# Patient Record
Sex: MALE | Race: WHITE | Employment: FULL TIME | URBAN - NONMETROPOLITAN AREA
[De-identification: names, ages, dates, MRNs, and addresses within clinical notes are randomized per-mention and may not be internally consistent; named-entity substitution may affect disease eponyms.]

---

## 2017-09-08 ENCOUNTER — HOSPITAL ENCOUNTER (OUTPATIENT)
Dept: CT IMAGING | Age: 52
Discharge: HOME | End: 2017-09-08
Payer: COMMERCIAL

## 2017-09-08 DIAGNOSIS — H74.21: ICD-10-CM

## 2017-09-08 DIAGNOSIS — H69.83 CHRONIC DYSFUNCTION OF BOTH EUSTACHIAN TUBES: ICD-10-CM

## 2017-09-08 PROCEDURE — 70480 CT ORBIT/EAR/FOSSA W/O DYE: CPT

## 2023-10-12 ENCOUNTER — HOSPITAL ENCOUNTER (EMERGENCY)
Age: 58
Discharge: HOME | End: 2023-10-12
Payer: COMMERCIAL

## 2023-10-12 VITALS
BODY MASS INDEX: 21.87 KG/M2 | SYSTOLIC BLOOD PRESSURE: 162 MMHG | HEART RATE: 90 BPM | DIASTOLIC BLOOD PRESSURE: 87 MMHG | RESPIRATION RATE: 16 BRPM | OXYGEN SATURATION: 97 % | HEIGHT: 73 IN | WEIGHT: 165 LBS | TEMPERATURE: 98.5 F

## 2023-10-12 DIAGNOSIS — R31.9 HEMATURIA, UNSPECIFIED TYPE: Primary | ICD-10-CM

## 2023-10-12 DIAGNOSIS — Z87.442 HISTORY OF KIDNEY STONES: ICD-10-CM

## 2023-10-12 LAB
BILIRUB UR STRIP.AUTO-MCNC: NEGATIVE MG/DL
CHARACTER UR: CLEAR
COLOR: YELLOW
GLUCOSE UR QL STRIP.AUTO: NEGATIVE MG/DL
KETONES UR QL STRIP.AUTO: NEGATIVE
NITRITE UR QL STRIP.AUTO: NEGATIVE
PH UR STRIP.AUTO: 5.5 [PH] (ref 5–9)
PROT UR STRIP.AUTO-MCNC: ABNORMAL MG/DL
RBC #/AREA URNS HPF: ABNORMAL /[HPF]
SP GR UR STRIP.AUTO: >= 1.03 (ref 1–1.03)
UROBILINOGEN, URINE: 0.2 EU/DL (ref 0.2–1)
WBC #/AREA URNS HPF: ABNORMAL /[HPF]

## 2023-10-12 PROCEDURE — 81003 URINALYSIS AUTO W/O SCOPE: CPT

## 2023-10-12 PROCEDURE — 99203 OFFICE O/P NEW LOW 30 MIN: CPT

## 2023-10-12 PROCEDURE — 87086 URINE CULTURE/COLONY COUNT: CPT

## 2023-10-12 RX ORDER — CIPROFLOXACIN 500 MG/1
500 TABLET, FILM COATED ORAL 2 TIMES DAILY
Qty: 14 TABLET | Refills: 0 | Status: SHIPPED | OUTPATIENT
Start: 2023-10-12 | End: 2023-10-19

## 2023-10-12 RX ORDER — TAMSULOSIN HYDROCHLORIDE 0.4 MG/1
0.4 CAPSULE ORAL DAILY
Qty: 5 CAPSULE | Refills: 0 | Status: SHIPPED | OUTPATIENT
Start: 2023-10-12 | End: 2023-10-17

## 2023-10-12 ASSESSMENT — ENCOUNTER SYMPTOMS
NAUSEA: 0
DIARRHEA: 0
ABDOMINAL PAIN: 0
VOMITING: 0
COUGH: 0

## 2023-10-12 ASSESSMENT — PAIN - FUNCTIONAL ASSESSMENT: PAIN_FUNCTIONAL_ASSESSMENT: NONE - DENIES PAIN

## 2023-10-12 NOTE — ED PROVIDER NOTES
[facility] Encounter      CHIEFCOMPLAINT       Chief Complaint   Patient presents with    Hematuria       Nurses Notes reviewed and I agree except as noted in the HPI. HISTORY OF PRESENT ILLNESS   Bernardo Lassiter is a 59 y.o. male who presents to the urgent care. He presents for evaluation of blood in his urine that started last night. No other symptoms. Had a kidney stone when he was younger. The patient/patient representative has no other acute complaints at this time. REVIEW OF SYSTEMS     Review of Systems   Constitutional:  Negative for chills, fatigue and fever. Respiratory:  Negative for cough. Cardiovascular:  Negative for chest pain. Gastrointestinal:  Negative for abdominal pain, diarrhea, nausea and vomiting. Genitourinary:  Positive for hematuria. Negative for dysuria, frequency, genital sores, penile discharge and urgency. Skin:  Negative for rash. Allergic/Immunologic: Negative for environmental allergies and food allergies. PAST MEDICAL HISTORY   History reviewed. No pertinent past medical history. SURGICAL HISTORY     Patient  has a past surgical history that includes Myringotomy Tympanostomy Tube Placement and Ear Exm Under Gen Anesth (Right, T). CURRENT MEDICATIONS       Previous Medications    ACETAMINOPHEN (TYLENOL) 500 MG TABLET    Take 1 tablet by mouth every 6 hours as needed    IBUPROFEN (ADVIL;MOTRIN) 200 MG TABLET    Take 1 tablet by mouth every 6 hours as needed       ALLERGIES     Patient is has No Known Allergies. FAMILY HISTORY     Patient'sfamily history includes Diabetes in an other family member; Hypertension in an other family member. SOCIAL HISTORY     Patient  reports that he has never smoked. He has never been exposed to tobacco smoke. He has never used smokeless tobacco. He reports current alcohol use. He reports that he does not use drugs.     PHYSICAL EXAM     ED TRIAGE VITALS  BP: (!) 162/87,

## 2023-10-14 LAB — BACTERIA UR CULT: NORMAL

## 2024-01-12 LAB — PROSTATE SPECIFIC ANTIGEN: 4 NG/ML

## 2024-01-30 ENCOUNTER — OFFICE VISIT (OUTPATIENT)
Dept: UROLOGY | Age: 59
End: 2024-01-30
Payer: COMMERCIAL

## 2024-01-30 VITALS — BODY MASS INDEX: 23.06 KG/M2 | RESPIRATION RATE: 16 BRPM | HEIGHT: 73 IN | WEIGHT: 174 LBS

## 2024-01-30 DIAGNOSIS — R31.0 GROSS HEMATURIA: Primary | ICD-10-CM

## 2024-01-30 DIAGNOSIS — R97.20 ELEVATED PSA: ICD-10-CM

## 2024-01-30 PROCEDURE — 99204 OFFICE O/P NEW MOD 45 MIN: CPT | Performed by: UROLOGY

## 2024-01-30 NOTE — PROGRESS NOTES
Federico Taylor MD.    [facility] [place] [facility]  [facility] UROLOGY  [address]  [address]  [facility] [address]  Dept: [phone]  Dept Fax: [phone]  Loc: [phone]    [facility] Office Note -     Patient:  Jose Elias Mueller  YOB: 1965    The patient is a 58 y.o. male who presents today for evaluation of the following problems:   Chief Complaint   Patient presents with    New Patient     PSA done prior, showed 4 in results.    Hematuria    referred/consultation requested by No primary care provider on file..    History of Present Illness:    Elevated PSA  First time check  No fam hx of prostate cancer  Auass 11  Mostly satisfied    Gross hematuria  One time episode  Episodic -- persistent on UA  Hx of kidney stones in past- pt thought hematuria was secondary to this        Requested/reviewed records from No primary care provider on file. office and/or outside physician/EMR    (Patient's old records have been requested, reviewed and pertinent findings summarized in today's note.)    Procedures Today: N/A      Last several PSA's:  Lab Results   Component Value Date    PSA 4.0 01/12/2024       Last total testosterone:  No results found for: \"TESTOSTERONE\"    Urinalysis today:  No results found for this visit on 01/30/24.    Last BUN and creatinine:  No results found for: \"BUN\"  No results found for: \"CREATININE\"      Imaging Reviewed during this Office Visit:   FEDERICO TAYLOR MD independently reviewed the images and verified the radiology reports from:    No results found.    PAST MEDICAL, FAMILY AND SOCIAL HISTORY:  No past medical history on file.  Past Surgical History:   Procedure Laterality Date    EAR EXAM UNDER GENERAL ANESTHESIA Right T    REMOVAL CHOLESTEATOMA    MYRINGOTOMY AND TYMPANOSTOMY TUBE PLACEMENT      Dr Rodrigues      Family History   Problem Relation Age of Onset    Diabetes Other     Hypertension Other      Outpatient Medications Marked as Taking

## 2024-02-28 ENCOUNTER — TELEPHONE (OUTPATIENT)
Dept: UROLOGY | Age: 59
End: 2024-02-28

## 2024-02-28 NOTE — TELEPHONE ENCOUNTER
CPT 99452- Prior authorization required/initiated  Spoke with: Anjana CROWLEY  Reference#: [id number]  Clinical documentation faxed to [phone]

## 2024-03-05 ENCOUNTER — HOSPITAL ENCOUNTER (OUTPATIENT)
Dept: CT IMAGING | Age: 59
Discharge: HOME | End: 2024-03-05
Attending: UROLOGY | Admitting: FAMILY MEDICINE
Payer: COMMERCIAL

## 2024-03-05 DIAGNOSIS — R31.0 GROSS HEMATURIA: ICD-10-CM

## 2024-03-05 LAB — POC CREATININE WHOLE BLOOD: 1 MG/DL (ref 0.5–1.2)

## 2024-03-05 PROCEDURE — 6360000004 HC RX CONTRAST MEDICATION: Performed by: UROLOGY

## 2024-03-05 PROCEDURE — 74178 CT ABD&PLV WO CNTR FLWD CNTR: CPT | Performed by: UROLOGY

## 2024-03-05 PROCEDURE — 82565 ASSAY OF CREATININE: CPT

## 2024-03-05 RX ADMIN — IOPAMIDOL 80 ML: 755 INJECTION, SOLUTION INTRAVENOUS at 07:18

## 2024-03-12 ENCOUNTER — HOSPITAL ENCOUNTER (OUTPATIENT)
Dept: UROLOGY | Age: 59
Discharge: HOME | End: 2024-03-12
Payer: COMMERCIAL

## 2024-03-12 VITALS
DIASTOLIC BLOOD PRESSURE: 95 MMHG | OXYGEN SATURATION: 99 % | WEIGHT: 175.5 LBS | RESPIRATION RATE: 18 BRPM | TEMPERATURE: 97.5 F | SYSTOLIC BLOOD PRESSURE: 168 MMHG | HEIGHT: 73 IN | HEART RATE: 84 BPM | BODY MASS INDEX: 23.26 KG/M2

## 2024-03-12 DIAGNOSIS — R97.20 ELEVATED PSA: Primary | ICD-10-CM

## 2024-03-12 LAB
BILIRUBIN URINE: NEGATIVE
BLOOD URINE, POC: ABNORMAL
CHARACTER, URINE: CLEAR
COLOR, URINE: YELLOW
GLUCOSE URINE: NEGATIVE MG/DL
KETONES, URINE: NEGATIVE
LEUKOCYTE CLUMPS, URINE: ABNORMAL
NITRITE, URINE: NEGATIVE
PH, URINE: 5.5 (ref 5–9)
PROTEIN, URINE: NEGATIVE MG/DL
SPECIFIC GRAVITY, URINE: >= 1.03 (ref 1–1.03)
UROBILINOGEN, URINE: 0.2 EU/DL (ref 0–1)

## 2024-03-12 PROCEDURE — 52000 CYSTOURETHROSCOPY: CPT

## 2024-03-12 NOTE — DISCHARGE INSTRUCTIONS
Discharge instructions: Cystoscopy  You May experience painful urination and see blood in the urine after your procedure.  This should resolve over time.      Pt ok to discharge home in good condition  No heavy lifting, >10 lbs for today  Pt should avoid strenuous activity for today  Pt should walk moderately at home  Pt ok to shower   Pt may resume diet as tolerated  Please call attending physician or hospital  with questions  Call or Present to ED if fever (> 101F), intractable nausea vomiting or pain.    PSA in 3 months, order given to patient    Follow up in office with JOSEPH Solares in 4 months.  Office will call to schedule.

## 2024-03-12 NOTE — OP NOTE
single:13281::\"left stent was removed with graspers in it's entirety\",\"right stent was removed with graspers in it's entirety\",\" both stents were removed with graspers in their entirety\",\" \"}    The cystoscope was removed.  The patient tolerated the procedure well.

## 2024-03-12 NOTE — PROGRESS NOTES
Patient arrived in [facility]rology Center for Cystoscopy  This procedure has been fully reviewed with the patient and written informed consent has been obtained.  1107 Procedure started with Dr. Elliott  1108 Procedure completed; patient tolerated well.  Dr. Elliott talked to patient in length about procedure findings.  Patient discharged.    PLAN     PSA in 3 months, order given to patient    Follow up in office with JOSEPH Solares in 4 months.  Office will call to schedule.

## 2024-07-17 LAB — PROSTATE SPECIFIC ANTIGEN: 6.7 NG/ML

## 2024-07-23 ENCOUNTER — OFFICE VISIT (OUTPATIENT)
Dept: UROLOGY | Age: 59
End: 2024-07-23
Payer: COMMERCIAL

## 2024-07-23 ENCOUNTER — TELEPHONE (OUTPATIENT)
Dept: UROLOGY | Age: 59
End: 2024-07-23

## 2024-07-23 VITALS — BODY MASS INDEX: 22.88 KG/M2 | RESPIRATION RATE: 16 BRPM | WEIGHT: 172.6 LBS | HEIGHT: 73 IN

## 2024-07-23 DIAGNOSIS — R97.20 ELEVATED PSA: Primary | ICD-10-CM

## 2024-07-23 DIAGNOSIS — R31.0 GROSS HEMATURIA: ICD-10-CM

## 2024-07-23 PROCEDURE — 99214 OFFICE O/P EST MOD 30 MIN: CPT

## 2024-07-23 RX ORDER — ATORVASTATIN CALCIUM 20 MG/1
20 TABLET, FILM COATED ORAL DAILY
COMMUNITY
Start: 2024-05-29

## 2024-07-23 NOTE — TELEPHONE ENCOUNTER
Patient scheduled for MRI PROSTATE W WO  at [facility] MR on 8/13/2024.  Arrival of 1530 for a 1600 scan time.  Order with instructions given to the patient in the office

## 2024-07-23 NOTE — PROGRESS NOTES
[facility] [place] [facility]  [facility] UROLOGY  [address]  [address]  [facility] [address]  Dept: [phone]  Loc: [phone]  Visit Date: 7/23/2024      HPI  Jose Elias Mueller is a 58 y.o. male that presents to the urology clinic for elevated PSA and gross hematuria follow-up.    Elliott patient. Elevated PSA on first check. Large rise on recheck from 4.0 to 6.7. No family history of prostate cancer. No prior MRI's or biopsies of the prostate.    Single episode of gross hematuria. Persistent microscopic hematuria on UA's. NEGATIVE hematuria workup. CTU and Cystoscopy in March 2024.    Most Recent PSA: 6.70 (07/17/24)      PAST MEDICAL, FAMILY AND SOCIAL HISTORY UPDATE:  Past Medical History:   Diagnosis Date    Gross hematuria      Past Surgical History:   Procedure Laterality Date    EAR EXAM UNDER GENERAL ANESTHESIA Right T    REMOVAL CHOLESTEATOMA    MYRINGOTOMY AND TYMPANOSTOMY TUBE PLACEMENT      Dr Rodrigues      Family History   Problem Relation Age of Onset    Diabetes Other     Hypertension Other      Outpatient Medications Marked as Taking for the 7/23/24 encounter (Office Visit) with Beck Graham PA-C   Medication Sig Dispense Refill    atorvastatin (LIPITOR) 20 MG tablet Take 1 tablet by mouth daily      UNABLE TO FIND          Patient has no known allergies.  Social History     Tobacco Use   Smoking Status Never    Passive exposure: Never   Smokeless Tobacco Never       Social History     Substance and Sexual Activity   Alcohol Use Yes       REVIEW OF SYSTEMS:  Constitutional: negative  Eyes: negative  Respiratory: negative  Cardiovascular: negative  Gastrointestinal: negative  Musculoskeletal: negative  Genitourinary: negative except for what is in HPI  Skin: negative   Neurological: negative  Hematological/Lymphatic: negative  Psychological: negative    Physical Exam:      Vitals:    07/23/24 1419   Resp: 16     Patient is a 58 y.o. male in no acute distress and alert and

## 2024-08-16 ENCOUNTER — HOSPITAL ENCOUNTER (OUTPATIENT)
Dept: MRI IMAGING | Age: 59
Discharge: HOME | End: 2024-08-16
Payer: COMMERCIAL

## 2024-08-16 DIAGNOSIS — R97.20 ELEVATED PSA: ICD-10-CM

## 2024-08-16 PROCEDURE — 76377 3D RENDER W/INTRP POSTPROCES: CPT

## 2024-08-16 PROCEDURE — 6360000004 HC RX CONTRAST MEDICATION

## 2024-08-16 PROCEDURE — A9579 GAD-BASE MR CONTRAST NOS,1ML: HCPCS

## 2024-08-16 RX ADMIN — GADOTERIDOL 15 ML: 279.3 INJECTION, SOLUTION INTRAVENOUS at 10:58

## 2024-08-21 ENCOUNTER — OFFICE VISIT (OUTPATIENT)
Dept: UROLOGY | Age: 59
End: 2024-08-21
Payer: COMMERCIAL

## 2024-08-21 VITALS — BODY MASS INDEX: 23.1 KG/M2 | WEIGHT: 174.3 LBS | RESPIRATION RATE: 16 BRPM | HEIGHT: 73 IN

## 2024-08-21 DIAGNOSIS — R35.1 BENIGN PROSTATIC HYPERPLASIA WITH NOCTURIA: ICD-10-CM

## 2024-08-21 DIAGNOSIS — N40.1 BENIGN PROSTATIC HYPERPLASIA WITH NOCTURIA: ICD-10-CM

## 2024-08-21 DIAGNOSIS — R97.20 ELEVATED PSA: Primary | ICD-10-CM

## 2024-08-21 DIAGNOSIS — R31.0 GROSS HEMATURIA: ICD-10-CM

## 2024-08-21 PROCEDURE — 99214 OFFICE O/P EST MOD 30 MIN: CPT

## 2024-08-21 RX ORDER — TAMSULOSIN HYDROCHLORIDE 0.4 MG/1
0.4 CAPSULE ORAL DAILY
Qty: 90 CAPSULE | Refills: 1 | Status: SHIPPED | OUTPATIENT
Start: 2024-08-21

## 2024-08-21 NOTE — PROGRESS NOTES
[facility] [place] [facility]  [facility] UROLOGY  [address]  [address]  [facility] [address]  Dept: [phone]  Loc: [phone]  Visit Date: 8/21/2024      HPI  Jose Elias Mueller is a 58 y.o. male that presents to the urology clinic for elevated PSA and gross hematuria follow-up.    Elliott patient. Elevated PSA on first check. Large rise on recheck from 4.0 to 6.7. No family history of prostate cancer. NORMAL workup. MRI with PI-RADS 2 findings. ExoDx WNL at 7.58.    Single episode of gross hematuria. Persistent microscopic hematuria on UA's. NEGATIVE hematuria workup. CTU and Cystoscopy in March 2024.    +Urinary frequency and nocturia x 2. Weakening urinary stream. Increasingly bothersome.    Most Recent PSA: 6.70 (07/17/24)      I personally reviewed images and their accompanying reports from the following studies:    MRI PROSTATE W WO CONTRAST     Result Date: 08/16/24    FINDINGS:   PROSTATE SIZE:  1. The prostate gland is markedly enlarged measuring 6.5 x 5.6 x 4.8 cm  2. The prostate volume is 90.85 ml.     TRANSITIONAL ZONE:  1. Markedly heterogeneous.  2. Multiple encapsulated and nonencapsulated nodules (PI-RADS 2     CENTRAL ZONE:  1. Unremarkable.     PERIPHERAL ZONE:  1. Predominantly intermediate T2 signal seen can relate to postinflammatory  changes.     SEMINAL VESICLES: Unremarkable.     NEUROVASCULAR BUNDLES: Intact     URINARY BLADDER/RECTUM/PELVIC DIAPHRAGM: Unremarkable.     LYMPH NODES:  1. Borderline 9 mm pelvic wall lymph node     OSSEOUS STRUCTURES:  1. Unremarkable.     OTHER:  1. Colonic diverticulosis.        IMPRESSION:  1. Marked prostatomegaly.  2. Overall PI-RADS assessment is PI-RADS 2.  3. The markedly heterogeneous appearance of the prostate decreases the  sensitivity for detecting clinically significant cancer.     Electronically signed by Dr. Yumiko Nguyen      PAST MEDICAL, FAMILY AND SOCIAL HISTORY UPDATE:  Past Medical History:   Diagnosis Date

## 2025-02-13 ENCOUNTER — TELEPHONE (OUTPATIENT)
Dept: UROLOGY | Age: 60
End: 2025-02-13

## 2025-02-13 NOTE — TELEPHONE ENCOUNTER
Patient went [facility] last night for retention and had a kirkpatrick placed. He has an appointment already scheduled on 02/18/2025.

## 2025-02-18 ENCOUNTER — TELEPHONE (OUTPATIENT)
Dept: UROLOGY | Age: 60
End: 2025-02-18

## 2025-02-18 ENCOUNTER — OFFICE VISIT (OUTPATIENT)
Dept: UROLOGY | Age: 60
End: 2025-02-18
Payer: COMMERCIAL

## 2025-02-18 VITALS
DIASTOLIC BLOOD PRESSURE: 88 MMHG | HEIGHT: 73 IN | SYSTOLIC BLOOD PRESSURE: 142 MMHG | WEIGHT: 171.7 LBS | BODY MASS INDEX: 22.76 KG/M2

## 2025-02-18 DIAGNOSIS — N40.1 BENIGN PROSTATIC HYPERPLASIA WITH NOCTURIA: ICD-10-CM

## 2025-02-18 DIAGNOSIS — R35.1 BENIGN PROSTATIC HYPERPLASIA WITH NOCTURIA: ICD-10-CM

## 2025-02-18 DIAGNOSIS — N40.1 BENIGN PROSTATIC HYPERPLASIA WITH URINARY RETENTION: ICD-10-CM

## 2025-02-18 DIAGNOSIS — Z87.898 HISTORY OF GROSS HEMATURIA: ICD-10-CM

## 2025-02-18 DIAGNOSIS — Z01.818 PRE-OP TESTING: ICD-10-CM

## 2025-02-18 DIAGNOSIS — R97.20 ELEVATED PSA: Primary | ICD-10-CM

## 2025-02-18 DIAGNOSIS — R35.1 NOCTURIA: ICD-10-CM

## 2025-02-18 DIAGNOSIS — R33.8 ACUTE URINARY RETENTION: ICD-10-CM

## 2025-02-18 DIAGNOSIS — R33.8 BENIGN PROSTATIC HYPERPLASIA WITH URINARY RETENTION: ICD-10-CM

## 2025-02-18 PROCEDURE — 99214 OFFICE O/P EST MOD 30 MIN: CPT

## 2025-02-18 RX ORDER — TAMSULOSIN HYDROCHLORIDE 0.4 MG/1
0.4 CAPSULE ORAL 2 TIMES DAILY
Qty: 180 CAPSULE | Refills: 1 | Status: SHIPPED | OUTPATIENT
Start: 2025-02-18

## 2025-02-18 NOTE — TELEPHONE ENCOUNTER
DO NOT TAKE  FISH OIL, MOBIC, IBUPROFEN, MOTRIN-LIKE DRUGS AND ANY MULTIVITAMINS OR OVER THE COUNTER SUPPLEMENTS 14 DAYS PRIOR TO SURGERY.    MUST HAVE AN ADULT OVER THE AGE OF 18 WITH YOU AT THE TIME OF THE DISCHARGE         Jose Elias Mueller 1965     Surgical Physician: Dr. Elliott      You have been scheduled for the procedure marked below:      Surgery: CYSTOSCOPY GREENLIGHT PHOTO VAPORIZATION OF PROSTATE         Date: 3/28/2025     Anesthesia:  General     Place of Service: [facility] --Second Floor Same Day Surgery         Arrive to same day surgery at:  CALL SURGERY DESK [phone], THE DAY PRIOR TO SURGERY BETWEEN 8AM-4PM, FOR ARRIVAL TIME (IF SURGERY IS ON A MONDAY, CALL THE FRIDAY PRIOR)      INSTRUCTIONS AS MARKED BELOW:    1.  DO NOT eat or drink anything after midnight before surgery.   2.  We prefer you shower or bathe with an antibacterial soap (Dial) the morning of surgery.  3  Please bring a current medication list, photo ID and insurance card(s) with you  4. Okay to take Tylenol  5. Take blood pressure or heart medication as directed, if taken in the morning take with a small sip of water  6.The office will call you in 1-2 days after your procedure to schedule a follow up.    DATE SENSITIVE PRE OP TESTING:    TO AVOID YOUR SURGERY BEING CANCELLED, DO TESTING ON THE DATE LISTED (*WALK IN* NO APPOINTMENT NEEDED).      DO EKG AND LABS NOW    DO URINE CULTURE ON 3/14/2025        Date: 2/18/2025

## 2025-02-18 NOTE — PROGRESS NOTES
[facility] [place] [facility]  [facility] UROLOGY  [address]  [address]  [facility] [address]  Dept: [phone]  Loc: [phone]  Visit Date: 2/18/2025      HPI  Jose Elias Mueller is a 59 y.o. male that presents to the urology clinic for urinary retention follow-up.    Acute Urinary Retention  First instance of retention. No coinciding UTI or constipation. Hernandez catheter placed at [facility] ED. Volume drained undocumented.    Taking Flomax 0.4 mg QD as prescribed.     [facility] w/ Obstruction on Cystoscopy from March of 2024.    Elevated PSA  No family history of prostate cancer. NORMAL workup. MRI with PI-RADS 2 findings. ExoDx WNL at 7.58.    Prostate Volume: 90.85 mL     Gross Hematuria  Single instance of gross hematuria. Persistent microscopic hematuria on UA's. NEGATIVE hematuria workup. CTU and Cystoscopy in March 2024.      PSA Trend  6.50   (02/12/25)  6.70   (07/17/24)      I personally reviewed images and their accompanying reports from the following studies:    MRI PROSTATE W WO CONTRAST     Result Date: 08/16/24    FINDINGS:   PROSTATE SIZE:  1. The prostate gland is markedly enlarged measuring 6.5 x 5.6 x 4.8 cm  2. The prostate volume is 90.85 ml.     TRANSITIONAL ZONE:  1. Markedly heterogeneous.  2. Multiple encapsulated and nonencapsulated nodules (PI-RADS 2     CENTRAL ZONE:  1. Unremarkable.     PERIPHERAL ZONE:  1. Predominantly intermediate T2 signal seen can relate to postinflammatory  changes.     SEMINAL VESICLES: Unremarkable.     NEUROVASCULAR BUNDLES: Intact     URINARY BLADDER/RECTUM/PELVIC DIAPHRAGM: Unremarkable.     LYMPH NODES:  1. Borderline 9 mm pelvic wall lymph node     OSSEOUS STRUCTURES:  1. Unremarkable.     OTHER:  1. Colonic diverticulosis.        IMPRESSION:  1. Marked prostatomegaly.  2. Overall PI-RADS assessment is PI-RADS 2.  3. The markedly heterogeneous appearance of the prostate decreases the  sensitivity for detecting clinically

## 2025-02-18 NOTE — TELEPHONE ENCOUNTER
Patient is scheduled for surgery with  on 3/28/2025. Surgery consent to be done on arrival. Patient states he/she does not follow with a cardiologist. Patient to do pre op EKG AND LABS NOW; DO URINE CULTURE ON 3/14/2025. Surgery instructions gone over with patient verbally in the office     Patient informed an adult over the age of 18 must be with them at the time of surgery and upon discharge      RADHA CONF#[id number] PER DALE

## 2025-02-20 ENCOUNTER — NURSE ONLY (OUTPATIENT)
Dept: UROLOGY | Age: 60
End: 2025-02-20

## 2025-02-20 DIAGNOSIS — R33.9 URINARY RETENTION: Primary | ICD-10-CM

## 2025-02-20 PROCEDURE — NBSRV NON-BILLABLE SERVICE: Performed by: NURSE PRACTITIONER

## 2025-02-20 NOTE — PROGRESS NOTES
Patient has given me verbal consent to perform kirkpatrick removal  Yes    DIAGNOSIS/INDICATION:  Urinary retention    Per Deena Collins APRN 10 cc of water deflated from kirkpatrick balloon. 16 Fr straight kirkpatrick removed without difficulty.    Foreskin reduced back down?  N/A      Pt will drink fluids and call office before 3PM or report to ER in 6-8 hours if patient unable to urinate.      F/u with provider as scheduled.

## 2025-02-24 ENCOUNTER — HOSPITAL ENCOUNTER (OUTPATIENT)
Age: 60
Discharge: HOME | End: 2025-02-24
Payer: COMMERCIAL

## 2025-02-24 DIAGNOSIS — Z01.818 PRE-OP TESTING: ICD-10-CM

## 2025-02-24 DIAGNOSIS — R97.20 ELEVATED PSA: ICD-10-CM

## 2025-02-24 DIAGNOSIS — R35.1 BENIGN PROSTATIC HYPERPLASIA WITH NOCTURIA: ICD-10-CM

## 2025-02-24 DIAGNOSIS — N40.1 BENIGN PROSTATIC HYPERPLASIA WITH NOCTURIA: ICD-10-CM

## 2025-02-24 LAB
EKG ATRIAL RATE: 67 BPM
EKG P AXIS: 68 DEGREES
EKG P-R INTERVAL: 166 MS
EKG Q-T INTERVAL: 370 MS
EKG QRS DURATION: 84 MS
EKG QTC CALCULATION (BAZETT): 390 MS
EKG R AXIS: 52 DEGREES
EKG T AXIS: 70 DEGREES
EKG VENTRICULAR RATE: 67 BPM

## 2025-02-24 PROCEDURE — 93005 ELECTROCARDIOGRAM TRACING: CPT

## 2025-02-25 LAB
BASOPHILS ABSOLUTE: 0 /ΜL
BASOPHILS RELATIVE PERCENT: 1 %
BUN BLDV-MCNC: 15 MG/DL
CALCIUM SERPL-MCNC: NORMAL MG/DL
CHLORIDE BLD-SCNC: 105 MMOL/L
CO2: 25 MMOL/L
CREAT SERPL-MCNC: 0.8 MG/DL
EGFR: 101
EOSINOPHILS ABSOLUTE: 0.1 /ΜL
EOSINOPHILS RELATIVE PERCENT: 3 %
GLUCOSE BLD-MCNC: 80 MG/DL
HCT VFR BLD CALC: 42.8 % (ref 41–53)
HEMOGLOBIN: 14 G/DL (ref 13.5–17.5)
LYMPHOCYTES ABSOLUTE: 0.9 /ΜL
LYMPHOCYTES RELATIVE PERCENT: 21 %
MCH RBC QN AUTO: 30.9 PG
MCHC RBC AUTO-ENTMCNC: 32.7 G/DL
MCV RBC AUTO: 95 FL
MONOCYTES ABSOLUTE: 0.5 /ΜL
MONOCYTES RELATIVE PERCENT: 12 %
NEUTROPHILS ABSOLUTE: 2.8 /ΜL
NEUTROPHILS RELATIVE PERCENT: 63 %
PDW BLD-RTO: 12.2 %
PLATELET # BLD: 224 K/ΜL
PMV BLD AUTO: NORMAL FL
POTASSIUM SERPL-SCNC: 4.6 MMOL/L
RBC # BLD: 4.53 10^6/ΜL
SODIUM BLD-SCNC: 141 MMOL/L
WBC # BLD: 4.5 10^3/ML

## 2025-03-10 ENCOUNTER — OFFICE VISIT (OUTPATIENT)
Dept: UROLOGY | Age: 60
End: 2025-03-10
Payer: COMMERCIAL

## 2025-03-10 VITALS — WEIGHT: 171 LBS | RESPIRATION RATE: 10 BRPM | BODY MASS INDEX: 22.66 KG/M2 | HEIGHT: 73 IN

## 2025-03-10 DIAGNOSIS — N40.1 BENIGN PROSTATIC HYPERPLASIA WITH NOCTURIA: ICD-10-CM

## 2025-03-10 DIAGNOSIS — R35.1 BENIGN PROSTATIC HYPERPLASIA WITH NOCTURIA: ICD-10-CM

## 2025-03-10 DIAGNOSIS — R97.20 ELEVATED PSA: ICD-10-CM

## 2025-03-10 DIAGNOSIS — R33.9 URINARY RETENTION: Primary | ICD-10-CM

## 2025-03-10 LAB
BILIRUBIN, URINE: NEGATIVE
BLOOD URINE, POC: NEGATIVE
CHARACTER, URINE: CLEAR
COLOR, UA: YELLOW
GLUCOSE URINE: NEGATIVE MG/DL
KETONES, URINE: NEGATIVE
LEUKOCYTE CLUMPS, URINE: NEGATIVE
NITRITE, URINE: NEGATIVE
PH, URINE: 5.5 (ref 5–9)
POST VOID RESIDUAL (PVR): 8 ML
PROTEIN, URINE: NEGATIVE MG/DL
SPECIFIC GRAVITY UA: >= 1.03 (ref 1–1.03)
UROBILINOGEN, URINE: 0.2 EU/DL (ref 0–1)

## 2025-03-10 PROCEDURE — 81003 URINALYSIS AUTO W/O SCOPE: CPT

## 2025-03-10 PROCEDURE — 99213 OFFICE O/P EST LOW 20 MIN: CPT

## 2025-03-10 PROCEDURE — 51798 US URINE CAPACITY MEASURE: CPT

## 2025-03-10 NOTE — PROGRESS NOTES
[facility] [place] [facility]  [facility] UROLOGY  [address]  [address]  [facility] [address]  Dept: [phone]  Loc: [phone]  Visit Date: 3/10/2025      HPI  Jose Elias Mueller is a 59 y.o. male that presents to the urology clinic for BPH follow-up.    Acute Urinary Retention  First instance of retention. No coinciding UTI or constipation. Hernandez catheter placed at [facility] ED. Volume drained undocumented.    Taking Flomax 0.4 mg BID as prescribed. Emptying improved with increase in Flomax. PVR of 8 mL in the office today.    [facility] w/ Obstruction on Cystoscopy from March of 2024.    Elevated PSA  No family history of prostate cancer. NORMAL workup. MRI with PI-RADS 2 findings. ExoDx WNL at 7.58.    Prostate Volume: 90.85 mL     Gross Hematuria  Single instance of gross hematuria. Persistent microscopic hematuria on UA's. NEGATIVE hematuria workup. CTU and Cystoscopy in March 2024.      PSA Trend  6.50   (02/12/25)  6.70   (07/17/24)    Results for orders placed or performed in visit on 03/10/25   POCT Urinalysis No Micro (Auto)   Result Value Ref Range    Glucose, Ur Negative NEGATIVE mg/dl    Bilirubin, Urine Negative     Ketones, Urine Negative NEGATIVE    Specific Gravity, UA >=1.030 1.002 - 1.030    Blood, UA POC Negative NEGATIVE    pH, Urine 5.50 5.0 - 9.0    Protein, Urine Negative NEGATIVE mg/dl    Urobilinogen, Urine 0.20 0.0 - 1.0 eu/dl    Nitrite, Urine Negative NEGATIVE    Leukocyte Clumps, Urine Negative NEGATIVE    Color, UA Yellow [id number]    Character, Urine Clear [id number]   poct post void residual   Result Value Ref Range    post void residual 8 ml           I personally reviewed images and their accompanying reports from the following studies:    MRI PROSTATE W WO CONTRAST     Result Date: 08/16/24    FINDINGS:   PROSTATE SIZE:  1. The prostate gland is markedly enlarged measuring 6.5 x 5.6 x 4.8 cm  2. The prostate volume is 90.85 ml.

## 2025-03-14 ENCOUNTER — LAB (OUTPATIENT)
Dept: LAB | Age: 60
End: 2025-03-14

## 2025-03-14 DIAGNOSIS — R97.20 ELEVATED PSA: ICD-10-CM

## 2025-03-14 DIAGNOSIS — N40.1 BENIGN PROSTATIC HYPERPLASIA WITH NOCTURIA: ICD-10-CM

## 2025-03-14 DIAGNOSIS — R35.1 BENIGN PROSTATIC HYPERPLASIA WITH NOCTURIA: ICD-10-CM

## 2025-03-14 DIAGNOSIS — Z01.818 PRE-OP TESTING: ICD-10-CM

## 2025-03-16 LAB
BACTERIA UR CULT: ABNORMAL
ORGANISM: ABNORMAL

## 2025-03-17 ENCOUNTER — RESULTS FOLLOW-UP (OUTPATIENT)
Dept: UROLOGY | Age: 60
End: 2025-03-17

## 2025-03-18 ENCOUNTER — PREP FOR PROCEDURE (OUTPATIENT)
Dept: UROLOGY | Age: 60
End: 2025-03-18

## 2025-03-20 RX ORDER — SODIUM CHLORIDE 0.9 % (FLUSH) 0.9 %
5-40 SYRINGE (ML) INJECTION EVERY 12 HOURS SCHEDULED
Status: CANCELLED | OUTPATIENT
Start: 2025-03-20

## 2025-03-20 RX ORDER — SODIUM CHLORIDE 9 MG/ML
INJECTION, SOLUTION INTRAVENOUS PRN
Status: CANCELLED | OUTPATIENT
Start: 2025-03-20

## 2025-03-20 RX ORDER — SODIUM CHLORIDE 0.9 % (FLUSH) 0.9 %
5-40 SYRINGE (ML) INJECTION PRN
Status: CANCELLED | OUTPATIENT
Start: 2025-03-20

## 2025-03-24 NOTE — PROGRESS NOTES
Pt stated he lost insurance as of 3/17/25 he was wondering who should he call in regards to this.  Offered to transfer him to Dr Elliott office he said he will call them later. He did apply to get [employer] insurance

## 2025-03-24 NOTE — FLOWSHEET NOTE
Follow all instructions given by your physician  If Urology case Call [phone] the weekday before procedure to find out Arrival Time.  Do not eat or drink anything after midnight prior to surgery(includes water, chewing gum, mints and ice chips)  Sips of water am of surgery with allowed medications  May brush teeth   Do not smoke or chew tobacco, drink alcoholic beverages or use any illicit drugs for 24 hours prior to surgery  Bring insurance info and photo ID  Bring pertinent paperwork with you from Doctor or surgeons's office  Wear clean comfortable, loose-fitting clothing  No make-up, nail polish, jewelry, piercings, or contact lenses to be worn day of surgery  No glue on dentures morning of surgery; you will be asked to remove them for surgery. Case for glasses.  Shower the night before and the morning of surgery with cleansing soap provided or a liquid antibacterial soap, dry with new fresh clean towel after each shower, no lotions, creams or powder.  Clean sheets and pillowcase on bed night before surgery  Bring medications in original bottles, Bring rescue inhalers with you  Bring CPAP/BIPAP machine if you have one ( you may be charged if one is needed in recovery room ) no pacemaker no     Do you have a DNR? no  Please Bring Healthcare Directive or Healthcare Power of  in so we can scan it into your chart.    Our pharmacy has a Meds to Beds program where they will deliver any new prescriptions you may have to your room before you leave. Our Pharmacy will clear it through your insurance; for example (same co pay). This enables you to take your new RX as soon as you need when you get home and avoids stop/wait delays on the way home.  Please have a form of payment with you and have someone designated as your Pharmacy contact with their phone # as you may not feel well or still be under the influence of anesthesia.    Please refer to the SSI-Surgical Site Infection Flyer you hopefully received in the  mail-together we can prevent infections; signs and symptoms reviewed.  When discharged be sure you understand how to care for your wound and that you have the Dr./office phone # to call if you have any concerns or questions about your wound.     needed at discharge and someone over 18 to stay with you for 24 hours overnight (surgery may be cancelled if you don't have this)  Report to [facility] on 2nd floor  If you would become ill prior to surgery, please call the surgeon  May have a visitor with you, we request that you limit to 2 visitors in pre-op area  Masks are recommended but not required, new masks at entrance desk  Call [phone] for any questions

## 2025-03-28 ENCOUNTER — ANESTHESIA (OUTPATIENT)
Dept: OPERATING ROOM | Age: 60
End: 2025-03-28
Payer: COMMERCIAL

## 2025-03-28 ENCOUNTER — ANESTHESIA EVENT (OUTPATIENT)
Dept: OPERATING ROOM | Age: 60
End: 2025-03-28
Payer: COMMERCIAL

## 2025-03-28 ENCOUNTER — HOSPITAL ENCOUNTER (OUTPATIENT)
Age: 60
Setting detail: OUTPATIENT SURGERY
Discharge: HOME | End: 2025-03-28
Attending: UROLOGY | Admitting: UROLOGY
Payer: COMMERCIAL

## 2025-03-28 ENCOUNTER — TELEPHONE (OUTPATIENT)
Dept: UROLOGY | Age: 60
End: 2025-03-28

## 2025-03-28 ENCOUNTER — HOSPITAL ENCOUNTER (EMERGENCY)
Age: 60
Discharge: HOME | End: 2025-03-28
Attending: EMERGENCY MEDICINE
Payer: COMMERCIAL

## 2025-03-28 VITALS
TEMPERATURE: 97 F | OXYGEN SATURATION: 97 % | BODY MASS INDEX: 22.45 KG/M2 | DIASTOLIC BLOOD PRESSURE: 85 MMHG | WEIGHT: 169.4 LBS | HEIGHT: 73 IN | RESPIRATION RATE: 18 BRPM | HEART RATE: 78 BPM | SYSTOLIC BLOOD PRESSURE: 131 MMHG

## 2025-03-28 VITALS
DIASTOLIC BLOOD PRESSURE: 88 MMHG | TEMPERATURE: 98.8 F | RESPIRATION RATE: 20 BRPM | HEIGHT: 73 IN | BODY MASS INDEX: 22.4 KG/M2 | HEART RATE: 123 BPM | SYSTOLIC BLOOD PRESSURE: 146 MMHG | WEIGHT: 169 LBS | OXYGEN SATURATION: 97 %

## 2025-03-28 DIAGNOSIS — R31.0 GROSS HEMATURIA: Primary | ICD-10-CM

## 2025-03-28 DIAGNOSIS — G89.18 POST-OP PAIN: Primary | ICD-10-CM

## 2025-03-28 LAB
ALBUMIN SERPL BCG-MCNC: 4.1 G/DL (ref 3.4–4.9)
ALP SERPL-CCNC: 86 U/L (ref 40–129)
ALT SERPL W/O P-5'-P-CCNC: 56 U/L (ref 10–50)
ANION GAP SERPL CALC-SCNC: 13 MEQ/L (ref 8–16)
AST SERPL-CCNC: 39 U/L (ref 10–50)
BACTERIA: ABNORMAL
BASOPHILS ABSOLUTE: 0 THOU/MM3 (ref 0–0.1)
BASOPHILS NFR BLD AUTO: 0.1 %
BILIRUB SERPL-MCNC: 0.5 MG/DL (ref 0.3–1.2)
BILIRUB UR QL STRIP: NEGATIVE
BUN SERPL-MCNC: 16 MG/DL (ref 8–23)
CALCIUM SERPL-MCNC: 8.9 MG/DL (ref 8.8–10.2)
CASTS #/AREA URNS LPF: ABNORMAL /LPF
CASTS #/AREA URNS LPF: ABNORMAL /LPF
CHARACTER UR: ABNORMAL
CHARCOAL URNS QL MICRO: ABNORMAL
CHLORIDE SERPL-SCNC: 104 MEQ/L (ref 98–111)
CO2 SERPL-SCNC: 23 MEQ/L (ref 22–29)
COLOR UR: YELLOW
CREAT SERPL-MCNC: 0.8 MG/DL (ref 0.7–1.2)
CRYSTALS URNS QL MICRO: ABNORMAL
DEPRECATED RDW RBC AUTO: 42.1 FL (ref 35–45)
EKG ATRIAL RATE: 110 BPM
EKG P AXIS: 64 DEGREES
EKG P-R INTERVAL: 160 MS
EKG Q-T INTERVAL: 324 MS
EKG QRS DURATION: 82 MS
EKG QTC CALCULATION (BAZETT): 438 MS
EKG R AXIS: 4 DEGREES
EKG T AXIS: 75 DEGREES
EKG VENTRICULAR RATE: 110 BPM
EOSINOPHIL NFR BLD AUTO: 0 %
EOSINOPHILS ABSOLUTE: 0 THOU/MM3 (ref 0–0.4)
EPITHELIAL CELLS, UA: ABNORMAL /HPF
ERYTHROCYTE [DISTWIDTH] IN BLOOD BY AUTOMATED COUNT: 12.7 % (ref 11.5–14.5)
GFR SERPL CREATININE-BSD FRML MDRD: > 90 ML/MIN/1.73M2
GLUCOSE SERPL-MCNC: 116 MG/DL (ref 74–109)
GLUCOSE UR QL STRIP.AUTO: NEGATIVE MG/DL
HCT VFR BLD AUTO: 43 % (ref 42–52)
HGB BLD-MCNC: 14.9 GM/DL (ref 14–18)
HGB UR QL STRIP.AUTO: ABNORMAL
IMM GRANULOCYTES # BLD AUTO: 0.03 THOU/MM3 (ref 0–0.07)
IMM GRANULOCYTES NFR BLD AUTO: 0.2 %
KETONES UR QL STRIP.AUTO: NEGATIVE
LEUKOCYTE ESTERASE UR QL STRIP.AUTO: ABNORMAL
LYMPHOCYTES ABSOLUTE: 0.5 THOU/MM3 (ref 1–4.8)
LYMPHOCYTES NFR BLD AUTO: 3.1 %
MCH RBC QN AUTO: 31.6 PG (ref 26–33)
MCHC RBC AUTO-ENTMCNC: 34.7 GM/DL (ref 32.2–35.5)
MCV RBC AUTO: 91.1 FL (ref 80–94)
MONOCYTES ABSOLUTE: 0.6 THOU/MM3 (ref 0.4–1.3)
MONOCYTES NFR BLD AUTO: 4.3 %
NEUTROPHILS ABSOLUTE: 13.9 THOU/MM3 (ref 1.8–7.7)
NEUTROPHILS NFR BLD AUTO: 92.3 %
NITRITE UR QL STRIP.AUTO: NEGATIVE
NRBC BLD AUTO-RTO: 0 /100 WBC
OSMOLALITY SERPL CALC.SUM OF ELEC: 281.6 MOSMOL/KG (ref 275–300)
PH UR STRIP.AUTO: 5.5 [PH] (ref 5–9)
PLATELET # BLD AUTO: 203 THOU/MM3 (ref 130–400)
PMV BLD AUTO: 11 FL (ref 9.4–12.4)
POTASSIUM SERPL-SCNC: 4.4 MEQ/L (ref 3.5–5.2)
PROT SERPL-MCNC: 7.1 G/DL (ref 6.4–8.3)
PROT UR STRIP.AUTO-MCNC: 100 MG/DL
RBC # BLD AUTO: 4.72 MILL/MM3 (ref 4.7–6.1)
RBC #/AREA URNS HPF: > 200 /HPF
RENAL EPI CELLS #/AREA URNS HPF: ABNORMAL /[HPF]
SODIUM SERPL-SCNC: 140 MEQ/L (ref 135–145)
SPECIFIC GRAVITY UA: 1.01 (ref 1–1.03)
UROBILINOGEN, URINE: 0.2 EU/DL (ref 0–1)
WBC # BLD AUTO: 15.1 THOU/MM3 (ref 4.8–10.8)
WBC #/AREA URNS HPF: ABNORMAL /HPF
YEAST LIKE FUNGI URNS QL MICRO: ABNORMAL

## 2025-03-28 PROCEDURE — 3600000013 HC SURGERY LEVEL 3 ADDTL 15MIN: Performed by: UROLOGY

## 2025-03-28 PROCEDURE — 99284 EMERGENCY DEPT VISIT MOD MDM: CPT

## 2025-03-28 PROCEDURE — 3700000001 HC ADD 15 MINUTES (ANESTHESIA): Performed by: UROLOGY

## 2025-03-28 PROCEDURE — 7100000001 HC PACU RECOVERY - ADDTL 15 MIN: Performed by: UROLOGY

## 2025-03-28 PROCEDURE — 7100000011 HC PHASE II RECOVERY - ADDTL 15 MIN: Performed by: UROLOGY

## 2025-03-28 PROCEDURE — 2580000003 HC RX 258

## 2025-03-28 PROCEDURE — 36415 COLL VENOUS BLD VENIPUNCTURE: CPT

## 2025-03-28 PROCEDURE — 2580000003 HC RX 258: Performed by: UROLOGY

## 2025-03-28 PROCEDURE — 96360 HYDRATION IV INFUSION INIT: CPT

## 2025-03-28 PROCEDURE — 80053 COMPREHEN METABOLIC PANEL: CPT

## 2025-03-28 PROCEDURE — 3600000003 HC SURGERY LEVEL 3 BASE: Performed by: UROLOGY

## 2025-03-28 PROCEDURE — 2500000003 HC RX 250 WO HCPCS: Performed by: UROLOGY

## 2025-03-28 PROCEDURE — 2709999900 HC NON-CHARGEABLE SUPPLY: Performed by: UROLOGY

## 2025-03-28 PROCEDURE — 6360000002 HC RX W HCPCS: Performed by: UROLOGY

## 2025-03-28 PROCEDURE — 7100000000 HC PACU RECOVERY - FIRST 15 MIN: Performed by: UROLOGY

## 2025-03-28 PROCEDURE — 6360000002 HC RX W HCPCS: Performed by: REGISTERED NURSE

## 2025-03-28 PROCEDURE — 85025 COMPLETE CBC W/AUTO DIFF WBC: CPT

## 2025-03-28 PROCEDURE — 93005 ELECTROCARDIOGRAM TRACING: CPT

## 2025-03-28 PROCEDURE — 7100000010 HC PHASE II RECOVERY - FIRST 15 MIN: Performed by: UROLOGY

## 2025-03-28 PROCEDURE — 2720000010 HC SURG SUPPLY STERILE: Performed by: UROLOGY

## 2025-03-28 PROCEDURE — 81001 URINALYSIS AUTO W/SCOPE: CPT

## 2025-03-28 PROCEDURE — 3700000000 HC ANESTHESIA ATTENDED CARE: Performed by: UROLOGY

## 2025-03-28 RX ORDER — SODIUM CHLORIDE 9 MG/ML
INJECTION, SOLUTION INTRAVENOUS PRN
Status: DISCONTINUED | OUTPATIENT
Start: 2025-03-28 | End: 2025-03-28 | Stop reason: HOSPADM

## 2025-03-28 RX ORDER — LIDOCAINE HYDROCHLORIDE 20 MG/ML
INJECTION, SOLUTION INTRAVENOUS
Status: DISCONTINUED | OUTPATIENT
Start: 2025-03-28 | End: 2025-03-28 | Stop reason: SDUPTHER

## 2025-03-28 RX ORDER — NALOXONE HYDROCHLORIDE 0.4 MG/ML
INJECTION, SOLUTION INTRAMUSCULAR; INTRAVENOUS; SUBCUTANEOUS PRN
Status: DISCONTINUED | OUTPATIENT
Start: 2025-03-28 | End: 2025-03-28 | Stop reason: HOSPADM

## 2025-03-28 RX ORDER — DEXAMETHASONE SODIUM PHOSPHATE 4 MG/ML
INJECTION, SOLUTION INTRA-ARTICULAR; INTRALESIONAL; INTRAMUSCULAR; INTRAVENOUS; SOFT TISSUE
Status: DISCONTINUED | OUTPATIENT
Start: 2025-03-28 | End: 2025-03-28 | Stop reason: SDUPTHER

## 2025-03-28 RX ORDER — SODIUM CHLORIDE 0.9 % (FLUSH) 0.9 %
5-40 SYRINGE (ML) INJECTION EVERY 12 HOURS SCHEDULED
Status: DISCONTINUED | OUTPATIENT
Start: 2025-03-28 | End: 2025-03-28 | Stop reason: HOSPADM

## 2025-03-28 RX ORDER — PHENAZOPYRIDINE HYDROCHLORIDE 100 MG/1
100 TABLET, FILM COATED ORAL 3 TIMES DAILY PRN
Qty: 6 TABLET | Refills: 0 | Status: SHIPPED | OUTPATIENT
Start: 2025-03-28 | End: 2026-03-28

## 2025-03-28 RX ORDER — SODIUM CHLORIDE 0.9 % (FLUSH) 0.9 %
5-40 SYRINGE (ML) INJECTION PRN
Status: DISCONTINUED | OUTPATIENT
Start: 2025-03-28 | End: 2025-03-28 | Stop reason: HOSPADM

## 2025-03-28 RX ORDER — 0.9 % SODIUM CHLORIDE 0.9 %
500 INTRAVENOUS SOLUTION INTRAVENOUS ONCE
Status: COMPLETED | OUTPATIENT
Start: 2025-03-28 | End: 2025-03-28

## 2025-03-28 RX ORDER — FENTANYL CITRATE 50 UG/ML
25 INJECTION, SOLUTION INTRAMUSCULAR; INTRAVENOUS EVERY 5 MIN PRN
Status: DISCONTINUED | OUTPATIENT
Start: 2025-03-28 | End: 2025-03-28 | Stop reason: HOSPADM

## 2025-03-28 RX ORDER — HYDROCODONE BITARTRATE AND ACETAMINOPHEN 5; 325 MG/1; MG/1
1 TABLET ORAL EVERY 6 HOURS PRN
Qty: 10 TABLET | Refills: 0 | Status: SHIPPED | OUTPATIENT
Start: 2025-03-28 | End: 2025-03-31

## 2025-03-28 RX ORDER — PROPOFOL 10 MG/ML
INJECTION, EMULSION INTRAVENOUS
Status: DISCONTINUED | OUTPATIENT
Start: 2025-03-28 | End: 2025-03-28 | Stop reason: SDUPTHER

## 2025-03-28 RX ORDER — DOXYCYCLINE HYCLATE 100 MG
100 TABLET ORAL 2 TIMES DAILY
Qty: 14 TABLET | Refills: 0 | Status: SHIPPED | OUTPATIENT
Start: 2025-03-28 | End: 2025-04-04

## 2025-03-28 RX ORDER — FENTANYL CITRATE 50 UG/ML
50 INJECTION, SOLUTION INTRAMUSCULAR; INTRAVENOUS EVERY 5 MIN PRN
Status: DISCONTINUED | OUTPATIENT
Start: 2025-03-28 | End: 2025-03-28 | Stop reason: HOSPADM

## 2025-03-28 RX ORDER — FENTANYL CITRATE 50 UG/ML
INJECTION, SOLUTION INTRAMUSCULAR; INTRAVENOUS
Status: DISCONTINUED | OUTPATIENT
Start: 2025-03-28 | End: 2025-03-28 | Stop reason: SDUPTHER

## 2025-03-28 RX ORDER — ONDANSETRON 2 MG/ML
INJECTION INTRAMUSCULAR; INTRAVENOUS
Status: DISCONTINUED | OUTPATIENT
Start: 2025-03-28 | End: 2025-03-28 | Stop reason: SDUPTHER

## 2025-03-28 RX ADMIN — DEXAMETHASONE SODIUM PHOSPHATE 4 MG: 4 INJECTION, SOLUTION INTRAMUSCULAR; INTRAVENOUS at 10:33

## 2025-03-28 RX ADMIN — FENTANYL CITRATE 25 MCG: 50 INJECTION, SOLUTION INTRAMUSCULAR; INTRAVENOUS at 10:51

## 2025-03-28 RX ADMIN — SODIUM CHLORIDE: 9 INJECTION, SOLUTION INTRAVENOUS at 11:27

## 2025-03-28 RX ADMIN — FENTANYL CITRATE 25 MCG: 50 INJECTION, SOLUTION INTRAMUSCULAR; INTRAVENOUS at 11:18

## 2025-03-28 RX ADMIN — FENTANYL CITRATE 25 MCG: 50 INJECTION, SOLUTION INTRAMUSCULAR; INTRAVENOUS at 10:39

## 2025-03-28 RX ADMIN — SODIUM CHLORIDE: 9 INJECTION, SOLUTION INTRAVENOUS at 09:47

## 2025-03-28 RX ADMIN — LIDOCAINE HYDROCHLORIDE 100 MG: 20 INJECTION, SOLUTION INTRAVENOUS at 10:33

## 2025-03-28 RX ADMIN — FENTANYL CITRATE 25 MCG: 50 INJECTION, SOLUTION INTRAMUSCULAR; INTRAVENOUS at 11:11

## 2025-03-28 RX ADMIN — FENTANYL CITRATE 25 MCG: 50 INJECTION, SOLUTION INTRAMUSCULAR; INTRAVENOUS at 10:35

## 2025-03-28 RX ADMIN — SODIUM CHLORIDE 500 ML: 0.9 INJECTION, SOLUTION INTRAVENOUS at 21:48

## 2025-03-28 RX ADMIN — FENTANYL CITRATE 25 MCG: 50 INJECTION, SOLUTION INTRAMUSCULAR; INTRAVENOUS at 11:04

## 2025-03-28 RX ADMIN — WATER 2000 MG: 1 INJECTION INTRAMUSCULAR; INTRAVENOUS; SUBCUTANEOUS at 10:36

## 2025-03-28 RX ADMIN — ONDANSETRON 4 MG: 2 INJECTION INTRAMUSCULAR; INTRAVENOUS at 10:33

## 2025-03-28 RX ADMIN — PROPOFOL 200 MG: 10 INJECTION, EMULSION INTRAVENOUS at 10:33

## 2025-03-28 ASSESSMENT — ENCOUNTER SYMPTOMS
SHORTNESS OF BREATH: 0
DIARRHEA: 0
NAUSEA: 0
CONSTIPATION: 0
ABDOMINAL PAIN: 0
CHEST TIGHTNESS: 0
VOMITING: 0

## 2025-03-28 ASSESSMENT — PAIN SCALES - WONG BAKER
WONGBAKER_NUMERICALRESPONSE: NO HURT
WONGBAKER_NUMERICALRESPONSE: NO HURT

## 2025-03-28 ASSESSMENT — PAIN - FUNCTIONAL ASSESSMENT
PAIN_FUNCTIONAL_ASSESSMENT: NONE - DENIES PAIN
PAIN_FUNCTIONAL_ASSESSMENT: 0-10
PAIN_FUNCTIONAL_ASSESSMENT: ADULT NONVERBAL PAIN SCALE (NPVS)

## 2025-03-28 ASSESSMENT — PAIN SCALES - GENERAL
PAINLEVEL_OUTOF10: 0

## 2025-03-28 NOTE — BRIEF OP NOTE
Brief Postoperative Note      Patient: Jose Elias Mueller  YOB: 1965  MRN: [id number]    Date of Procedure: 3/28/2025    Pre-Op Diagnosis Codes:      * Benign prostatic hyperplasia with urinary retention [N40.1, R33.8]     * Nocturia [R35.1]    Post-Op Diagnosis: Same       Procedure(s):  CYSTOSCOPY GREENLIGHT PHOTO VAPORIZATION OF PROSTATE    Surgeon(s):  Federico Elliott MD    Assistant:  * No surgical staff found *    Anesthesia: General    Estimated Blood Loss (mL): Minimal    Complications: None    Specimens:   * No specimens in log *    Implants:  * No implants in log *      Drains:   Urinary Catheter 03/28/25 3 Way (Active)       Findings:  Very large prostate. Hernandez out Monday . Pain meds abx pyridium  2-3 weeks flavio pvr    Electronically signed by FEDERICO ELLIOTT MD on 3/28/2025 at 11:43 AM

## 2025-03-28 NOTE — PROGRESS NOTES
1135 Pt arrives to pacu at this time. Pt is arousable to voice but does not answer questions or follow commands. Falls asleep easily. Respirs unlabored on room air.Pt appears in no acute distress.  VSS.     1145 Pt awakens to voice. Shakes head \"no\" when asked if having pain. Falls back asleep. VSS on room air.     1155 - Pt awakens to voice. Able to follow commands and asnwers questions appropriately. Denies pain. States it \"just feels like he has to pee\" Denies nausea or feeling cold. VSS.     1205 - Pt meets criteria to discharge from pacu at this time. Pt denies pain, nausea or feeling cold. Respirs unlabored on room air. VSS. Transported to [facility] in stable condition. Wife at bedside.

## 2025-03-28 NOTE — H&P
LOUISE TAYLOR MD  History and Physical    Patient:  Jose Elias Mueller  MRN: [id number]  YOB: 1965    HISTORY OF PRESENT ILLNESS:     The patient is a 59 y.o. male who presents with bph. Here for procedure.    Patient's old records, notes and chart reviewed and summarized above.     LOUISE TAYLOR MD independently reviewed the images and verified the radiology reports from:    No results found.      Past Medical History:    Past Medical History:   Diagnosis Date    Gross hematuria     Hyperlipidemia     Kidney stones        Past Surgical History:    Past Surgical History:   Procedure Laterality Date    EAR EXAM UNDER GENERAL ANESTHESIA Right T    REMOVAL CHOLESTEATOMA    MYRINGOTOMY AND TYMPANOSTOMY TUBE PLACEMENT      Dr Rodrigues        Medications Prior to Admission:    Prior to Admission medications    Medication Sig Start Date End Date Taking? Authorizing Provider   tamsulosin (FLOMAX) 0.4 MG capsule Take 1 capsule by mouth 2 times daily 2/18/25  Yes Beck Graham PA-C   atorvastatin (LIPITOR) 20 MG tablet Take 1 tablet by mouth daily 5/29/24  Yes ProviderZackary MD   UNABLE TO FIND  7/21/23  Yes ProviderZackary MD       Allergies:  Patient has no known allergies.    Social History:    Social History     Socioeconomic History    Marital status:      Spouse name: Not on file    Number of children: Not on file    Years of education: Not on file    Highest education level: Not on file   Occupational History    Not on file   Tobacco Use    Smoking status: Never     Passive exposure: Never    Smokeless tobacco: Never   Vaping Use    Vaping status: Never Used   Substance and Sexual Activity    Alcohol use: Yes     Comment: socialy    Drug use: No    Sexual activity: Not on file   Other Topics Concern    Not on file   Social History Narrative    Not on file     Social Drivers of Health     Financial Resource Strain: Not on file   Food Insecurity: Not on file   Transportation Needs: Not on  file   Physical Activity: Not on file   Stress: Not on file   Social Connections: Not on file   Intimate Partner Violence: Not on file   Housing Stability: Not on file       Family History:    Family History   Problem Relation Age of Onset    Diabetes Mother     No Known Problems Father     Diabetes Other     Hypertension Other        REVIEW OF SYSTEMS:  Constitutional: negative  Eyes: negative  Respiratory: negative  Cardiovascular: negative  Gastrointestinal: negative  Genitourinary: no acute issues  Musculoskeletal: negative  Skin: negative   Neurological: negative  Hematological/Lymphatic: negative  Psychological: negative    Physical Exam:      Patient Vitals for the past 24 hrs:   BP Temp Temp src Pulse Resp SpO2 Height Weight   03/28/25 0917 (!) 154/86 97.7 °F (36.5 °C) Temporal 88 16 97 % 1.854 m (6' 1\") 76.8 kg (169 lb 6.4 oz)     Constitutional: Patient in no acute distress;   Neuro: alert and oriented to person place and time.    Psych: Mood and affect normal.  Skin: Normal  Lungs: Respiratory effort normal, CTA  Cardiovascular:  Normal peripheral pulses; no murmur. Normal rhythm  Abdomen: Soft, non-tender, non-distended with no CVA, flank pain, hepatosplenomegaly or hernia.  Kidneys normal.  Bladder non-tender and not distended.      LABS:   No results for input(s): \"WBC\", \"HGB\", \"HCT\", \"MCV\", \"PLT\" in the last 72 hours.  No results for input(s): \"NA\", \"K\", \"CL\", \"CO2\", \"PHOS\", \"BUN\", \"CREATININE\" in the last 72 hours.    Invalid input(s): \"CA\"  Lab Results   Component Value Date    PSA 6.5 02/12/2025    PSA 6.7 07/17/2024    PSA 4.0 01/12/2024         Urinalysis: No results for input(s): \"COLORU\", \"PHUR\", \"LABCAST\", \"WBCUA\", \"RBCUA\", \"MUCUS\", \"TRICHOMONAS\", \"YEAST\", \"BACTERIA\", \"CLARITYU\", \"SPECGRAV\", \"LEUKOCYTESUR\", \"UROBILINOGEN\", \"BILIRUBINUR\", \"BLOODU\" in the last 72 hours.    Invalid input(s): \"NITRATE\", \"GLUCOSEUKETONESUAMORPHOUS\"

## 2025-03-28 NOTE — ANESTHESIA PRE PROCEDURE
Department of Anesthesiology  Preprocedure Note       Name:  Jose Elias Mueller   Age:  59 y.o.  :  1965                                          MRN:  [id number]         Date:  3/28/2025      Surgeon: Surgeon(s):  Federico Elliott MD    Procedure: Procedure(s):  CYSTOSCOPY GREENLIGHT PHOTO VAPORIZATION OF PROSTATE    Medications prior to admission:   Prior to Admission medications    Medication Sig Start Date End Date Taking? Authorizing Provider   tamsulosin (FLOMAX) 0.4 MG capsule Take 1 capsule by mouth 2 times daily 25  Yes Beck Graham PA-C   atorvastatin (LIPITOR) 20 MG tablet Take 1 tablet by mouth daily 24  Yes ProviderZackary MD   UNABLE TO FIND  23  Yes ProviderZackary MD       Current medications:    Current Facility-Administered Medications   Medication Dose Route Frequency Provider Last Rate Last Admin   • sodium chloride flush 0.9 % injection 5-40 mL  5-40 mL IntraVENous 2 times per day Federico Elliott MD       • sodium chloride flush 0.9 % injection 5-40 mL  5-40 mL IntraVENous PRN Federico Elliott MD       • 0.9 % sodium chloride infusion   IntraVENous PRN Federico Elliott  mL/hr at 25 0947 New Bag at 25 0947   • ceFAZolin (ANCEF) 2,000 mg in sterile water 20 mL IV syringe  2,000 mg IntraVENous On Call to OR Federico Elliott MD           Allergies:  No Known Allergies    Problem List:    Patient Active Problem List   Diagnosis Code   • Deviated nasal septum J34.2   • Mixed hearing loss H90.8   • Eustachian tube dysfunction H69.90   • Otitis media H66.90   • Hearing loss H91.90   • Cholesteatoma of attic H71.00   • Sensorineural hearing loss H90.5   • Cerumen impaction H61.20   • Benign prostatic hyperplasia with urinary retention N40.1, R33.8   • Nocturia R35.1       Past Medical History:        Diagnosis Date   • Gross hematuria    • Hyperlipidemia    • Kidney stones        Past Surgical History:        Procedure Laterality Date   • EAR EXAM UNDER GENERAL ANESTHESIA Right T

## 2025-03-28 NOTE — DISCHARGE INSTRUCTIONS
Follow up monday with office to remove kirkpatrick catheter  Follow up with urology provider in 2 to 3 weeks  Office to call.     Norco prn for pain  Pyridium for pain with urination prn  Doxycycline for 7 days (antibiotic)    Greenlight Photovaporization of the Prostate (PVP)    Procedure    After you are asleep the doctor will insert a small catheter with a camera on the end of it (scope) into your urethra.  After the scope is in place the doctor will pass a laser device through the scope to the location of the enlarged prostate tissue.  The laser will be used to vaporize the prostate tissue obstructing urine flow.  A temporary urinary catheter will likely be placed at the end of the procedure to let urine drain from your bladder.         ?Post operative Recovery    -Many patients can go home the same day of the procedure but some need to stay overnight.   -Many patients are discharged with a catheter in place.  Your surgeon will determine how long the catheter should remain in place prior to removal.  ?    -You may see blood with urination on and off for at least 4 weeks, this is normal and will improve. The most important thing is if the catheter is draining and you are able to empty your bladder.    -You may have the urge to urinate despite having the catheter in place and draining well and bladder muscle spasms around the urinary catheter.  Your physician may prescribe a medication called oxybutynin (Ditropan) to help with this symptom.     -Vaseline or antibiotic ointment at the urethra where the catheter is inserted can help with discomfort there.    -After catheter removal you may have burning off and on for a few weeks and this is expected.  If the burning is severe your doctor may also advise you to take medicine that numbs the burning.? It is available by prescription and over the counter.? Brand names include Pyridium and Uristat.    -Some patients experience greater urgency and frequency to urinate after  kirkpatrick removal and this can take several weeks to resolve.  If severe, your physician may prescribe medication to help with symptoms.   -Avoid riding on a lawnmower, tractor, golf cart, 4-mclaughlin or other all-terrain vehicle over rough ground for 4 weeks or until cleared by your surgeon as this may prompt increased bleeding.       Normal Postoperative Instructions   -No driving on day of surgery and while on narcotic pain medication    -No heavy lifting (>20 pounds), pushing, pulling or straining for 6 weeks from surgery or until cleared by your surgeon.    -Walk moderately at home    -Ok to shower?    -Resume diet as tolerated    -Drink at least 64 ounces of fluid per day.   -Take prescriptions as directed        Please call the office or hospital  with questions    Call or Present to ED if fever (> 101F), intractable nausea vomiting, or pain.        Office will facilitate scheduling of follow-up appointment.??

## 2025-03-28 NOTE — PROGRESS NOTES
1400- Catheter draining. CBI clamped     1425- Catheter bloody but clear with no clots and draining. Dr. Elliott contacted to look at catheter to make sure patient is okay to discharge home.     1430- Dr. Lexi iyer with patient discharging home.     Patient educated to drink plenty of water/fluids and educated on catheter.     Pt has met discharge criteria and states he is ready for discharge to home. IV removed, gauze and tape applied. Dressed in own clothes and personal belongings gathered. Discharge instructions (with opioid medication education information) given to pt and family; pt and family verbalized understanding of discharge instructions, prescriptions and follow up appointments. Pt transported to discharge lobby by [facility] staff.

## 2025-03-28 NOTE — PROGRESS NOTES
Patient oriented to Same Day department and admitted to Same Day Surgery room 17.   Patient verbalized approval for first name, last initial with physician name on unit whiteboard.     Plan of care reviewed with patient.   Patient room whiteboard filled out and discussed with patient and responsible adult.   Patient and responsible adult offered Same Day Welcome Packet to review.    Call light in reach.   Bed in lowest position, locked, with one bed rail up.   SCDs and warming blanket in place.  Appropriate arm bands on patient.   Bathroom offered.   All questions and concerns of patient addressed.        Meds to Beds:   Patient informed of [facility] Meds to [facility]eds program during admission. Patient has declined use of program.

## 2025-03-28 NOTE — ANESTHESIA POSTPROCEDURE EVALUATION
Department of Anesthesiology  Postprocedure Note    Patient: Jose Elias Mueller  MRN: [id number]  YOB: 1965  Date of evaluation: 3/28/2025    Procedure Summary       Date: 03/28/25 Room / Location: [facility] OR 03 / [facility] OR    Anesthesia Start: 1030 Anesthesia Stop: 1136    Procedure: CYSTOSCOPY GREENLIGHT PHOTO VAPORIZATION OF PROSTATE Diagnosis:       Benign prostatic hyperplasia with urinary retention      Nocturia      (Benign prostatic hyperplasia with urinary retention [N40.1, R33.8])      (Nocturia [R35.1])    Surgeons: Federico Elliott MD Responsible Provider: Esteban Saucedo DO    Anesthesia Type: general ASA Status: 2            Anesthesia Type: No value filed.    Sukumar Phase I: Sukumar Score: 10    Sukumar Phase II: Sukumar Score: 10    Anesthesia Post Evaluation    Comments: [facility]  POST-ANESTHESIA NOTE       Name:  Jose Elias Mueller                                         Age:  59 y.o.  MRN:  [id number]      Last Vitals:  /85   Pulse 76   Temp 97 °F (36.1 °C) (Temporal)   Resp 18   Ht 1.854 m (6' 1\")   Wt 76.8 kg (169 lb 6.4 oz)   SpO2 99%   BMI 22.35 kg/m²   Patient Vitals in the past 4 hrs:  03/28/25 1231, BP:121/85, Temp:97 °F (36.1 °C), Temp src:Temporal, Pulse:76, Resp:18, SpO2:99 %  03/28/25 1210, BP:133/89, Pulse:66, Resp:18, SpO2:99 %  03/28/25 1203, Pulse:77  03/28/25 1145, BP:133/86, Pulse:83, Resp:16, SpO2:97 %  03/28/25 1140, BP:138/82, Pulse:95, Resp:19, SpO2:91 %  03/28/25 1135, BP:138/89, Temp:97.2 °F (36.2 °C), Temp src:Temporal, Pulse:95, Resp:10, SpO2:97 %    Level of Consciousness:  Awake    Respiratory:  Stable    Oxygen Saturation:  Stable    Cardiovascular:  Stable    Hydration:  Adequate    PONV:  Stable    Post-op Pain:  Adequate analgesia    Post-op Assessment:  No apparent anesthetic complications    Additional Follow-Up / Treatment / Comment:  None    Esteban Saucedo DO  March 28, 2025   1:55 PM      No notable events documented.

## 2025-03-28 NOTE — PROGRESS NOTES
Discharge orders placed per Dr Elliott post op note.   Labs and urine reviewed.   PDMP reviewed.     . Hernandez out Monday . Pain meds abx pyridium  2-3 weeks flavio pvr

## 2025-03-29 NOTE — ED TRIAGE NOTES
Pt to the ED with c/o blood clots in his kirkpatrick catheter. Pt reports he had Greenlight photovaporization of the samll prostate today with Dr. Elliott. Pt reports they told him blood clots in the catheter. Pt reports he noticed a few clots around 30 min ago. Pt denies any pain. Pt reports the catheter is draining well otherwise.

## 2025-03-29 NOTE — DISCHARGE INSTRUCTIONS
If you develop an obstruction of your Hernandez and it is no longer draining, please come back to the ED.  If you also develop a fever, shortness of breath, chest pain then please come back to the ED for further evaluation.

## 2025-03-29 NOTE — ED PROVIDER NOTES
PATIENT NAME: Jose Elias Mueller  MRN: [id number]  : 1965  [place]: 3/28/2025    I performed a history and physical examination of the patient and discussed management with the Resident. I reviewed the Resident's note and agree with the documented findings and plan of care. Any areas of disagreement are noted on the chart. I was personally present for the key portions of any procedures and have documented in the chart those procedures where I was not present during the key portions. I have reviewed the emergency nurses triage note and agree with the chief complaint, past medical history, past surgical history, allergies, medications, social and family history as documented unless otherwise noted below.    MEDICAL DEDISION MAKING (MDM)     Jose Elias Mueller is a 59 y.o. male presents with hematuria.     S/p cystoscopy and greenlight photo vaporization of prostate today. He noticed hematuria and clots in kirkpatrick catheter.  He denies fever or suprapubic pain.  He is noted to be tachycardic on arrival    EKG interpreted by me as sinus tachycardia, ventricular rate 111 bpm, AZ interval 168 ms, QRS duration 82 ms,  ms, no acute ischemic changes.    ED workup is reassuring.  Tachycardia improves upon reassessment after receiving 500 ml normal saline bolus.  Kirkpatrick catheter is manually irrigated by nurse without clogging.     Discharged with urology follow-up as scheduled.      Vitals:    25 2116   BP: (!) 146/88   Pulse: (!) 123   Resp: 20   Temp: 98.8 °F (37.1 °C)   TempSrc: Oral   SpO2: 97%   Weight: 76.7 kg (169 lb)   Height: 1.854 m (6' 1\")     Labs Reviewed   COMPREHENSIVE METABOLIC PANEL - Abnormal; Notable for the following components:       Result Value    Glucose 116 (*)     ALT 56 (*)     All other components within normal limits   CBC WITH AUTO DIFFERENTIAL - Abnormal; Notable for the following components:    WBC 15.1 (*)     Neutrophils Absolute 13.9 (*)     Lymphocytes Absolute 0.5 (*)     All other

## 2025-03-29 NOTE — ED PROVIDER NOTES
[facility] EMERGENCY DEPARTMENT  EMERGENCY DEPARTMENT ENCOUNTER          Pt Name: Jose Elias Mueller  MRN: [id number]  Birthdate 1965  Date of evaluation: 3/28/2025  Physician: Emiliana Jack DO  Supervising Attending Physician: Marley att. providers found       CHIEF COMPLAINT       Chief Complaint   Patient presents with    Hematuria     DE LUNA CATHETER         HISTORY OF PRESENT ILLNESS    HPI  Jose Elias Mueller is a 59 y.o. male who presents to the emergency department from home, by private vehicle for evaluation of hematuria.  Patient had greenlight procedure done earlier today by urology and was discharged home.  He notes that he started passing blood clots and that concerned him prompting him to come into the ED for further evaluation.  He did not know how to get a hold of urology and therefore wanted to come in to get checked.  Patient is very anxious regarding the blood clots.  Patient otherwise states that the De Luna is draining well and is not having any pain.  He denies any fevers, chills.    The patient has no other acute complaints at this time.      REVIEW OF SYSTEMS   Review of Systems   Constitutional:  Negative for activity change, appetite change, chills, diaphoresis and fatigue.   Respiratory:  Negative for chest tightness and shortness of breath.    Cardiovascular:  Negative for chest pain.   Gastrointestinal:  Negative for abdominal pain, constipation, diarrhea, nausea and vomiting.   Genitourinary:  Positive for hematuria.        Passing blood clots in urine         PAST MEDICAL AND SURGICAL HISTORY     Past Medical History:   Diagnosis Date    Gross hematuria     Hyperlipidemia     Kidney stones      Past Surgical History:   Procedure Laterality Date    EAR EXAM UNDER GENERAL ANESTHESIA Right T    REMOVAL CHOLESTEATOMA    MYRINGOTOMY AND TYMPANOSTOMY TUBE PLACEMENT      Dr Rodrigues          MEDICATIONS   No current facility-administered medications for this encounter.    Current

## 2025-03-31 ENCOUNTER — CLINICAL SUPPORT (OUTPATIENT)
Dept: UROLOGY | Age: 60
End: 2025-03-31

## 2025-03-31 DIAGNOSIS — R33.8 BENIGN PROSTATIC HYPERPLASIA WITH URINARY RETENTION: Primary | ICD-10-CM

## 2025-03-31 DIAGNOSIS — N40.1 BENIGN PROSTATIC HYPERPLASIA WITH URINARY RETENTION: Primary | ICD-10-CM

## 2025-03-31 PROCEDURE — NBSRV NON-BILLABLE SERVICE

## 2025-03-31 NOTE — PROGRESS NOTES
I have personally verified, reviewed, and approved of these actions and the plan for follow-up as documented.

## 2025-03-31 NOTE — PROGRESS NOTES
Patient has given me verbal consent to perform kirkpatrick removal  Yes    DIAGNOSIS/INDICATION:  BPH with obstruction    Per Beck RUIZ 10 cc of water deflated from kirkpatrick balloon. 22 Fr straight kirkpatrick removed without difficulty.    Foreskin reduced back down?  N/A      Pt will drink fluids and report to ER in 6-8 hours if patient unable to urinate.      F/u with provider as scheduled.

## 2025-04-05 NOTE — OP NOTE
Federico Elliott MD.  Urologic Surgery      [facility]    DATE: 3/28/2025  Patient:  Jose Elias Mueller  MRN: [id number]  YOB: 1965    SURGEON: Federico Elliott MD.    ASSISTANT: none    PREOPERATIVE DIAGNOSIS: BPH with outlet obstruction    POSTOPERATIVE DIAGNOSIS: BPH with outlet obstruction    PROCEDURE PERFORMED:  Cystoscopy, Greenlight Photovaporization of Prostate,        ANESTHESIA: General    COMPLICATIONS: none    OR BLOOD LOSS:  Minimal    FLUIDS: Cystalloids per Anesthesia    SPECIMENS:  * No specimens in log *  none    DRAINS: [address] 3 way kirkpatrick    INDICATIONS FOR PROCEDURE:  The patient is a 59 y.o. male who presents today with Benign prostatic hyperplasia with urinary retention [N40.1, R33.8]  Nocturia [R35.1] here for CYSTOSCOPY GREENLIGHT PHOTO VAPORIZATION OF PROSTATE. After risks, benefits and alternatives of the procedure were discussed with the patient, the patient elected to proceed.     DETAILS OF PROCEDURE:  After informed consent was obtained in the preoperative area, the patient was taken back to the operating room and transferred to the operating table in supine position.  EPC cuffs were placed. The machine was turned on.  Anesthesia was induced and antibiotics were given.  The patient was placed in modified dorsal lithotomy position and sterilely prepped and draped in a standard fashion.  A timeout occurred.  Two patient identifiers were used.  The 25F rigid scope with the visual obturator was carefully advanced through the urethra.  .  Once within the bladder the visual obturator was exchanged for the resection bridge.     the ureteral orifices were very close to the bladder neck    The prostate was surveyed. the lateral lobes were noted to be significantly obstructing.. There was no median lobe present. Enucleation of the median lobe was not performed. The vaporization was started proximal with a power setting of 80W. Both the left and right lateral lobes were  vaporized in their entirety down to the level of the surgical capsule up to a power of 180 john. With this complete, the apical dissection was carried out delicately. All of the obstructing adenoma was vaporized. Exquisite care was taken to ensure the integrity of the urinary sphincter. Once completed, the sphincter was evaluated and found to be clear of the resection bed, and completely intact. The anterior adenoma was the last tissue to be addressed. The scope was rotated for ease of resection. One last evaluation of the prostatic urethra demonstrated complete vaporization of the gland to the surgical capsule circumferentially. The scope was advanced into the bladder. The ureteral orifices were re-surveyed and noted to be in pristine condition, free of laser scatter.     The cystoscope was then removed, and a 22F 3-way Hernandez catheter was placed into the bladder. When urine returned, the balloon was instilled with sterile water. The catheter was attached to gentle bladder irrigation using 0.9% normal saline. The catheter was fixed to the leg using a Luis M strap. The patient was then awakened and discharged back to the PACU in good and stable condition.     Follow-Up: The bladder will be irrigated in the PACU. As long as the urine remains clear, the irrigation port will be plugged and the patient will be discharged home with plans to follow for catheter removal.

## 2025-04-14 ENCOUNTER — OFFICE VISIT (OUTPATIENT)
Dept: UROLOGY | Age: 60
End: 2025-04-14

## 2025-04-14 VITALS — RESPIRATION RATE: 10 BRPM | BODY MASS INDEX: 22.66 KG/M2 | HEIGHT: 73 IN | WEIGHT: 171 LBS

## 2025-04-14 DIAGNOSIS — N40.1 BENIGN PROSTATIC HYPERPLASIA WITH URINARY RETENTION: ICD-10-CM

## 2025-04-14 DIAGNOSIS — R97.20 ELEVATED PSA: ICD-10-CM

## 2025-04-14 DIAGNOSIS — R33.8 BENIGN PROSTATIC HYPERPLASIA WITH URINARY RETENTION: ICD-10-CM

## 2025-04-14 DIAGNOSIS — R33.9 URINARY RETENTION: Primary | ICD-10-CM

## 2025-04-14 LAB
BILIRUBIN, URINE: NEGATIVE
BLOOD URINE, POC: ABNORMAL
CHARACTER, URINE: CLEAR
COLOR, UA: ABNORMAL
GLUCOSE URINE: NEGATIVE MG/DL
KETONES, URINE: NEGATIVE
LEUKOCYTE CLUMPS, URINE: ABNORMAL
NITRITE, URINE: NEGATIVE
PH, URINE: 5.5 (ref 5–9)
POST VOID RESIDUAL (PVR): 44 ML
PROTEIN, URINE: 100 MG/DL
SPECIFIC GRAVITY UA: 1.02 (ref 1–1.03)
UROBILINOGEN, URINE: 0.2 EU/DL (ref 0–1)

## 2025-04-14 PROCEDURE — 51798 US URINE CAPACITY MEASURE: CPT

## 2025-04-14 PROCEDURE — 99024 POSTOP FOLLOW-UP VISIT: CPT

## 2025-04-14 PROCEDURE — 81003 URINALYSIS AUTO W/O SCOPE: CPT

## 2025-04-14 NOTE — PROGRESS NOTES
[facility] [place] [facility]  [facility]  [address]  [address]  [facility] [address]  Dept: [phone]  Loc: [phone]  Visit Date: 4/14/2025      HPI  Jose Elias Mueller is a 59 y.o. male that presents to the urology clinic for post-op check.    BPH and History of Urinary Retention  LLH w/ Obstruction on Cystoscopy from March of 2024. Recent incidence of urinary retention. Symptoms not at goal despite Flomax 0.4 mg QD.    Now S/P Cystoscopy, Greenlight PVP. Doing well post-operatively. Gross hematuria and tissue passage resolved.    Elevated PSA  No family history of prostate cancer. NORMAL workup. MRI with PI-RADS 2 findings. ExoDx WNL at 7.58.    Prostate Volume: 90.85 mL     Gross Hematuria  Single instance of gross hematuria. Persistent microscopic hematuria on UA's. NEGATIVE hematuria workup. CTU and Cystoscopy in March 2024.      PSA Trend  6.50   (02/12/25)  6.70   (07/17/24)    Results for orders placed or performed in visit on 04/14/25   POCT Urinalysis No Micro (Auto)   Result Value Ref Range    Glucose, Ur Negative NEGATIVE mg/dl    Bilirubin, Urine Negative     Ketones, Urine Negative NEGATIVE    Specific Gravity, UA 1.020 1.002 - 1.030    Blood, UA POC Large (A) NEGATIVE    pH, Urine 5.50 5.0 - 9.0    Protein, Urine 100 (A) NEGATIVE mg/dl    Urobilinogen, Urine 0.20 0.0 - 1.0 eu/dl    Nitrite, Urine Negative NEGATIVE    Leukocyte Clumps, Urine Trace (A) NEGATIVE    Color, UA Red (A) YELLOW-STRAW    Character, Urine Clear [id number].[id number]   poct post void residual   Result Value Ref Range    post void residual 44 ml           I personally reviewed images and their accompanying reports from the following studies:    MRI PROSTATE W WO CONTRAST     Result Date: 08/16/24    FINDINGS:   PROSTATE SIZE:  1. The prostate gland is markedly enlarged measuring 6.5 x 5.6 x 4.8 cm  2. The prostate volume is 90.85 ml.     TRANSITIONAL ZONE:  1. Markedly heterogeneous.  2. Multiple

## 2025-07-10 ENCOUNTER — HOSPITAL ENCOUNTER (OUTPATIENT)
Age: 60
Discharge: HOME | End: 2025-07-10
Payer: COMMERCIAL

## 2025-07-10 DIAGNOSIS — R97.20 ELEVATED PSA: ICD-10-CM

## 2025-07-10 DIAGNOSIS — N40.1 BENIGN PROSTATIC HYPERPLASIA WITH URINARY RETENTION: ICD-10-CM

## 2025-07-10 DIAGNOSIS — R33.8 BENIGN PROSTATIC HYPERPLASIA WITH URINARY RETENTION: ICD-10-CM

## 2025-07-10 LAB — PSA SERPL-MCNC: 3.79 NG/ML (ref 0–1)

## 2025-07-10 PROCEDURE — 36415 COLL VENOUS BLD VENIPUNCTURE: CPT

## 2025-07-10 PROCEDURE — 84153 ASSAY OF PSA TOTAL: CPT

## 2025-07-15 ENCOUNTER — OFFICE VISIT (OUTPATIENT)
Dept: UROLOGY | Age: 60
End: 2025-07-15
Payer: COMMERCIAL

## 2025-07-15 VITALS — HEIGHT: 73 IN | RESPIRATION RATE: 16 BRPM | BODY MASS INDEX: 22.19 KG/M2 | WEIGHT: 167.4 LBS

## 2025-07-15 DIAGNOSIS — R31.0 GROSS HEMATURIA: ICD-10-CM

## 2025-07-15 DIAGNOSIS — R33.8 BENIGN PROSTATIC HYPERPLASIA WITH URINARY RETENTION: Primary | ICD-10-CM

## 2025-07-15 DIAGNOSIS — N40.1 BENIGN PROSTATIC HYPERPLASIA WITH URINARY RETENTION: Primary | ICD-10-CM

## 2025-07-15 DIAGNOSIS — R97.20 ELEVATED PSA: ICD-10-CM

## 2025-07-15 PROCEDURE — 99214 OFFICE O/P EST MOD 30 MIN: CPT | Performed by: UROLOGY

## 2025-07-15 RX ORDER — OXYBUTYNIN CHLORIDE 10 MG/1
10 TABLET, EXTENDED RELEASE ORAL DAILY
Qty: 90 TABLET | Refills: 3 | Status: SHIPPED | OUTPATIENT
Start: 2025-07-15 | End: 2025-10-13

## 2025-07-15 NOTE — PROGRESS NOTES
PI-RADS assessment is PI-RADS 2.  3. The markedly heterogeneous appearance of the prostate decreases the  sensitivity for detecting clinically significant cancer.     Electronically signed by Dr. Yumiko Nguyen      PAST MEDICAL, FAMILY AND SOCIAL HISTORY UPDATE:  Past Medical History:   Diagnosis Date    Gross hematuria     Hyperlipidemia     Kidney stones      Past Surgical History:   Procedure Laterality Date    EAR EXAM UNDER GENERAL ANESTHESIA Right T    REMOVAL CHOLESTEATOMA    MYRINGOTOMY AND TYMPANOSTOMY TUBE PLACEMENT      Dr Ravi JIMENEZ N/A 3/28/2025    CYSTOSCOPY GREENLIGHT PHOTO VAPORIZATION OF PROSTATE performed by Federico Elliott MD at [facility]     Family History   Problem Relation Age of Onset    Diabetes Mother     No Known Problems Father     Diabetes Other     Hypertension Other      Outpatient Medications Marked as Taking for the 7/15/25 encounter (Office Visit) with Federico Elliott MD   Medication Sig Dispense Refill    tamsulosin (FLOMAX) 0.4 MG capsule Take 1 capsule by mouth 2 times daily 180 capsule 1    atorvastatin (LIPITOR) 20 MG tablet Take 1 tablet by mouth daily         Patient has no known allergies.  Social History     Tobacco Use   Smoking Status Never    Passive exposure: Never   Smokeless Tobacco Never       Social History     Substance and Sexual Activity   Alcohol Use Yes    Comment: socialy       REVIEW OF SYSTEMS:  Constitutional: negative  Eyes: negative  Respiratory: negative  Cardiovascular: negative  Gastrointestinal: negative  Musculoskeletal: negative  Genitourinary: negative except for what is in HPI  Skin: negative   Neurological: negative  Hematological/Lymphatic: negative  Psychological: negative    Physical Exam:      Vitals:    07/15/25 0907   Resp: 16     Patient is a 59 y.o. male in no acute distress and alert and oriented to person, place and time.    Pulmonary: Non-labored respiration.  Cardiovascular: Normal rate, regular rhythm, normal peripheral pulses.  Skin:

## (undated) DEVICE — SOLUTION IRRIG 1000ML STRL H2O USP PLAS POUR BTL

## (undated) DEVICE — 1000ML,PRESSURE INFUSER W/STOPCOCK: Brand: MEDLINE

## (undated) DEVICE — LASER FIBER: Brand: MOXY

## (undated) DEVICE — CATHETER URETH 22FR 30CC BLLN F 3 W SPEC M RND TIP TWO

## (undated) DEVICE — CYSTO: Brand: MEDLINE INDUSTRIES, INC.

## (undated) DEVICE — SOLUTION IRRIG 3000ML 0.9% SOD CHL USP UROMATIC PLAS CONT

## (undated) DEVICE — DRAINBAG,ANTI-REFLUX TOWER,L/F,2000ML,LL: Brand: MEDLINE